# Patient Record
Sex: FEMALE | Race: WHITE | NOT HISPANIC OR LATINO
[De-identification: names, ages, dates, MRNs, and addresses within clinical notes are randomized per-mention and may not be internally consistent; named-entity substitution may affect disease eponyms.]

---

## 2020-04-15 ENCOUNTER — TRANSCRIPTION ENCOUNTER (OUTPATIENT)
Age: 35
End: 2020-04-15

## 2020-10-06 PROBLEM — Z00.00 ENCOUNTER FOR PREVENTIVE HEALTH EXAMINATION: Status: ACTIVE | Noted: 2020-10-06

## 2020-10-08 ENCOUNTER — APPOINTMENT (OUTPATIENT)
Dept: NEUROLOGY | Facility: CLINIC | Age: 35
End: 2020-10-08
Payer: MEDICAID

## 2020-10-08 VITALS
HEIGHT: 61 IN | TEMPERATURE: 97.5 F | DIASTOLIC BLOOD PRESSURE: 83 MMHG | BODY MASS INDEX: 22.66 KG/M2 | WEIGHT: 120 LBS | HEART RATE: 76 BPM | SYSTOLIC BLOOD PRESSURE: 137 MMHG

## 2020-10-08 DIAGNOSIS — Z78.9 OTHER SPECIFIED HEALTH STATUS: ICD-10-CM

## 2020-10-08 DIAGNOSIS — Z83.3 FAMILY HISTORY OF DIABETES MELLITUS: ICD-10-CM

## 2020-10-08 DIAGNOSIS — R55 SYNCOPE AND COLLAPSE: ICD-10-CM

## 2020-10-08 DIAGNOSIS — Z80.9 FAMILY HISTORY OF MALIGNANT NEOPLASM, UNSPECIFIED: ICD-10-CM

## 2020-10-08 DIAGNOSIS — Z82.49 FAMILY HISTORY OF ISCHEMIC HEART DISEASE AND OTHER DISEASES OF THE CIRCULATORY SYSTEM: ICD-10-CM

## 2020-10-08 DIAGNOSIS — R56.9 UNSPECIFIED CONVULSIONS: ICD-10-CM

## 2020-10-08 PROCEDURE — 99204 OFFICE O/P NEW MOD 45 MIN: CPT

## 2020-10-08 RX ORDER — NORETHINDRONE ACETATE AND ETHINYL ESTRADIOL 1MG-20(21)
1-20 KIT ORAL
Refills: 0 | Status: ACTIVE | COMMUNITY

## 2020-10-08 NOTE — CONSULT LETTER
[Dear  ___] : Dear  [unfilled], [Consult Letter:] : I had the pleasure of evaluating your patient, [unfilled]. [Please see my note below.] : Please see my note below. [Consult Closing:] : Thank you very much for allowing me to participate in the care of this patient.  If you have any questions, please do not hesitate to contact me. [FreeTextEntry2] : Kendrick Lucas [FreeTextEntry3] : Sincerely,\par \par \par Noa Villanueva MD\par Diplomate, [employer] Neu[employer]\par Board Certified in the Subspecialty of Clinical Neurophysiology\par Board Certified in the Subspecialty of Sleep Medicine\par Board Certified in the Subspecialty of Epilepsy\par

## 2020-10-08 NOTE — REVIEW OF SYSTEMS
[As Noted in HPI] : as noted in HPI [Negative] : Genitourinary [FreeTextEntry4] : sinus condition [FreeTextEntry9] : back pain

## 2020-10-08 NOTE — HISTORY OF PRESENT ILLNESS
[FreeTextEntry1] : Ms. Alvarado is here today for neurological evaluation.\par She is accompanied by her .\par On 9/9/20 she was not feeling well.\par She says that it was 4 AM. She felt lightheaded and sweaty.\par At 9 AM she had some breakfast. She decided to take a shower. She started to feel off in the shower and saw a red light in front of her. The next thing she recalled was that she was sitting on the bed talking to the police.\par Her  heard a thump and then a humming.\par He found her lying on the floor. She was very stiff. Her eyes were rolled back, her lips were purple and her arms were shaking.\par He thinks that she was unconscious for ~ 2-3 minutes.\par She was confused when she regained consciousness.\par She told the paramedics that she had passed out the night before. She was confused for at least 30 minutes.\par \par She did have broken sleep for a few nights prior to the event.\par \par She had jaw pain for 1 week but no tongue bite or urinary incontinence.\par \par She has never had any episodes of loss of consciousness before.\par Since this happened she noticed 1 or 2 episodes of dropping things.\par \par She was born full term, no developmental problems. There is no history of febrile seizures, head trauma, CNS infection or family history of seizures. \par No previous episodes of waking up with tongue bites, urinary incontinence or myoclonic jerks. She denies history of stereotypical  dmitriy vu, olfactory hallucinations or rising epigastric sensations.\par \par She was brought to [facility] and had blood tests and an x-ray done of her back.\par She sustained a compression fracture of her thoracic spine.\par \par She had some photophobia for a few days.\par

## 2020-10-08 NOTE — PHYSICAL EXAM
[FreeTextEntry1] : Examination:\par Constitutional: normal, no apparent distress\par Eyes: normal conjunctiva b/l, no ptosis, visual fields full\par Respiratory: no respiratory distress, normal effort, normal auscultation\par Cardiovascular: normal rate, rhythm, no murmurs\par Neck: supple, no masses\par Vascular: carotids normal\par Skin: normal color, no rashes\par Psych: normal mood, affect\par \par Neurological:\par Memory: normal memory, oriented to person, place, time\par Language intact/no aphasia\par Cranial Nerves: II-XII intact, Pupils equally round and reactive to light, ocular muscles/movements intact, no ptosis, no facial weakness, tongue protrudes normally in the midline, \par Motor: normal tone, no pronator drift, full strength in all four extremities in the proximal and distal muscle groups\par Coordination: Fine motor movements intact, rapid alternating movements intact, finger to nose intact bilaterally\par Sensory: intact to light touch, vibration, joint position sense\par DTRs: symmetric, 2+ in b/l triceps, 2+ in b/l biceps, 2+ in b/l brachioradialis, 2+ in bilateral patellars, 2+ in bilateral Achilles, Babinskis negative bilaterally\par Gait: narrow based, steady, able to walk on heels, toes, tandem gait\par \par

## 2020-10-08 NOTE — DISCUSSION/SUMMARY
[FreeTextEntry1] : Ms. Alvarado is a 35 year old woman who had an episode of loss of consciousness.\par Based on the description from her  and the fact that she had prolonged confusion I am suspicious that this was a seizure.\par \par She has no known seizure risk factors.\par She has a normal neurological examination.\par I am ordering MRI of the brain with and without contrast and with attention to the temporal lobes.\par She will also have a 24 hour EEG.\par I explained that these tests will be done to determine the risk of future events.\par I also explained that even in the event that these studies are normal, since she seems to have had an unprovoked seizure, I do recommend treatment with an anticonvulsant. She and her  would like to hold off on starting medications until after the tests.\par She can call me if she changes her mind.\par \par I discussed seizure safety and driving rules with the patient.\par I explained that under [place] state law, driving is prohibited for one year after a seizure, or for six months with a physician's letter.\par I also counseled the patient not to go swimming alone or to take baths alone (shower instead). I told the patient to use caution in any activity during which a seizure may result in serious injury or death (standing on train platforms, operating power tools, climbing ladders, etc.).\par I reiterated the importance of getting 7-8 hours of sleep per night. \par She should not drink more than 1-2 alcoholic drinks/ night (on a full stomach) and should hold off on this until she is on a stable dose of medication.\par \par \par I will follow up with Ms. Alvarado after her MRI brain and EEG, sooner if needed.

## 2020-10-22 ENCOUNTER — APPOINTMENT (OUTPATIENT)
Dept: NEUROLOGY | Facility: CLINIC | Age: 35
End: 2020-10-22
Payer: MEDICAID

## 2020-10-22 ENCOUNTER — APPOINTMENT (OUTPATIENT)
Dept: MRI IMAGING | Facility: CLINIC | Age: 35
End: 2020-10-22

## 2020-10-22 ENCOUNTER — OUTPATIENT (OUTPATIENT)
Dept: OUTPATIENT SERVICES | Facility: HOSPITAL | Age: 35
LOS: 1 days | End: 2020-10-22

## 2020-10-22 DIAGNOSIS — R56.9 UNSPECIFIED CONVULSIONS: ICD-10-CM

## 2020-10-22 PROCEDURE — 99072 ADDL SUPL MATRL&STAF TM PHE: CPT

## 2020-10-22 PROCEDURE — 95816 EEG AWAKE AND DROWSY: CPT

## 2020-10-23 ENCOUNTER — APPOINTMENT (OUTPATIENT)
Dept: NEUROLOGY | Facility: CLINIC | Age: 35
End: 2020-10-23
Payer: MEDICAID

## 2020-10-23 PROCEDURE — 95719 EEG PHYS/QHP EA INCR W/O VID: CPT

## 2020-10-23 PROCEDURE — 95708 EEG WO VID EA 12-26HR UNMNTR: CPT

## 2020-10-23 PROCEDURE — 99072 ADDL SUPL MATRL&STAF TM PHE: CPT

## 2020-11-03 ENCOUNTER — NON-APPOINTMENT (OUTPATIENT)
Age: 35
End: 2020-11-03

## 2022-05-10 ENCOUNTER — NON-APPOINTMENT (OUTPATIENT)
Age: 37
End: 2022-05-10

## 2022-12-26 ENCOUNTER — NON-APPOINTMENT (OUTPATIENT)
Age: 37
End: 2022-12-26

## 2023-08-17 ENCOUNTER — OFFICE (OUTPATIENT)
Dept: URBAN - METROPOLITAN AREA CLINIC 63 | Facility: CLINIC | Age: 38
Setting detail: OPHTHALMOLOGY
End: 2023-08-17
Payer: MEDICAID

## 2023-08-17 DIAGNOSIS — H16.223: ICD-10-CM

## 2023-08-17 PROCEDURE — 92012 INTRM OPH EXAM EST PATIENT: CPT | Performed by: STUDENT IN AN ORGANIZED HEALTH CARE EDUCATION/TRAINING PROGRAM

## 2023-08-17 ASSESSMENT — KERATOMETRY
OS_AXISANGLE_DEGREES: 095
OD_AXISANGLE_DEGREES: 084
OS_K2POWER_DIOPTERS: 47.00
OD_K1POWER_DIOPTERS: 45.75
OS_K1POWER_DIOPTERS: 45.25
OD_K2POWER_DIOPTERS: 47.25

## 2023-08-17 ASSESSMENT — REFRACTION_AUTOREFRACTION
OS_SPHERE: +2.00
OD_AXIS: 171
OS_CYLINDER: -0.75
OD_SPHERE: +1.00
OS_AXIS: 001
OD_CYLINDER: -0.50

## 2023-08-17 ASSESSMENT — VISUAL ACUITY
OS_BCVA: 20/25
OD_BCVA: 20/25

## 2023-08-17 ASSESSMENT — CONFRONTATIONAL VISUAL FIELD TEST (CVF)
OD_FINDINGS: FULL
OS_FINDINGS: FULL

## 2023-08-17 ASSESSMENT — AXIALLENGTH_DERIVED
OD_AL: 22.2756
OS_AL: 22.0959

## 2023-08-17 ASSESSMENT — SPHEQUIV_DERIVED
OD_SPHEQUIV: 0.75
OS_SPHEQUIV: 1.625

## 2023-08-17 ASSESSMENT — TEAR BREAK UP TIME (TBUT)
OD_TBUT: 1+
OS_TBUT: 1+

## 2023-08-17 ASSESSMENT — SUPERFICIAL PUNCTATE KERATITIS (SPK)
OD_SPK: 1+ 2+
OS_SPK: 1+ 2+

## 2023-08-17 ASSESSMENT — TONOMETRY
OS_IOP_MMHG: 17
OD_IOP_MMHG: 18